# Patient Record
Sex: MALE | Race: WHITE | URBAN - METROPOLITAN AREA
[De-identification: names, ages, dates, MRNs, and addresses within clinical notes are randomized per-mention and may not be internally consistent; named-entity substitution may affect disease eponyms.]

---

## 2024-11-19 ENCOUNTER — APPOINTMENT (RX ONLY)
Dept: URBAN - METROPOLITAN AREA CLINIC 148 | Facility: CLINIC | Age: 59
Setting detail: DERMATOLOGY
End: 2024-11-19

## 2024-11-19 DIAGNOSIS — L30.4 ERYTHEMA INTERTRIGO: ICD-10-CM | Status: RESOLVING

## 2024-11-19 PROCEDURE — 99203 OFFICE O/P NEW LOW 30 MIN: CPT

## 2024-11-19 PROCEDURE — ? COUNSELING

## 2024-11-19 PROCEDURE — ? PRESCRIPTION MEDICATION MANAGEMENT

## 2024-11-19 PROCEDURE — ? PRESCRIPTION

## 2024-11-19 RX ORDER — MUPIROCIN 20 MG/G
OINTMENT TOPICAL
Qty: 22 | Refills: 1 | Status: ERX | COMMUNITY
Start: 2024-11-19

## 2024-11-19 RX ADMIN — MUPIROCIN: 20 OINTMENT TOPICAL at 00:00

## 2024-11-19 ASSESSMENT — LOCATION DETAILED DESCRIPTION DERM: LOCATION DETAILED: LEFT ANTERIOR PROXIMAL THIGH

## 2024-11-19 ASSESSMENT — LOCATION ZONE DERM: LOCATION ZONE: LEG

## 2024-11-19 ASSESSMENT — BSA RASH: BSA RASH: 0

## 2024-11-19 ASSESSMENT — LOCATION SIMPLE DESCRIPTION DERM: LOCATION SIMPLE: LEFT THIGH

## 2024-11-19 NOTE — PROCEDURE: PRESCRIPTION MEDICATION MANAGEMENT
Modify Regimen: Pt was using hibaclens to clean area but advised pt to use it no more than 2-3 x a week
Continue Regimen: OTC medicated powered (pt does not know name)
Render In Strict Bullet Format?: No
Initiate Treatment: Mupirocin cream BID X1-2 weeks
Detail Level: Zone